# Patient Record
Sex: FEMALE
[De-identification: names, ages, dates, MRNs, and addresses within clinical notes are randomized per-mention and may not be internally consistent; named-entity substitution may affect disease eponyms.]

---

## 2019-09-04 ENCOUNTER — HOSPITAL ENCOUNTER (OUTPATIENT)
Dept: HOSPITAL 5 - OR | Age: 34
Discharge: HOME | End: 2019-09-04
Attending: OBSTETRICS & GYNECOLOGY
Payer: MEDICAID

## 2019-09-04 VITALS — SYSTOLIC BLOOD PRESSURE: 107 MMHG | DIASTOLIC BLOOD PRESSURE: 64 MMHG

## 2019-09-04 DIAGNOSIS — F17.210: ICD-10-CM

## 2019-09-04 DIAGNOSIS — Z80.8: ICD-10-CM

## 2019-09-04 DIAGNOSIS — Z83.3: ICD-10-CM

## 2019-09-04 DIAGNOSIS — Z79.899: ICD-10-CM

## 2019-09-04 DIAGNOSIS — Z3A.09: ICD-10-CM

## 2019-09-04 DIAGNOSIS — Z98.891: ICD-10-CM

## 2019-09-04 DIAGNOSIS — O02.1: Primary | ICD-10-CM

## 2019-09-04 DIAGNOSIS — Z98.890: ICD-10-CM

## 2019-09-04 PROCEDURE — 59820 CARE OF MISCARRIAGE: CPT

## 2019-09-04 PROCEDURE — 88305 TISSUE EXAM BY PATHOLOGIST: CPT

## 2019-09-04 PROCEDURE — 86901 BLOOD TYPING SEROLOGIC RH(D): CPT

## 2019-09-04 PROCEDURE — 86900 BLOOD TYPING SEROLOGIC ABO: CPT

## 2019-09-04 RX ADMIN — HYDROMORPHONE HYDROCHLORIDE PRN MG: 1 INJECTION, SOLUTION INTRAMUSCULAR; INTRAVENOUS; SUBCUTANEOUS at 10:13

## 2019-09-04 RX ADMIN — HYDROMORPHONE HYDROCHLORIDE PRN MG: 1 INJECTION, SOLUTION INTRAMUSCULAR; INTRAVENOUS; SUBCUTANEOUS at 09:53

## 2019-09-04 RX ADMIN — HYDROMORPHONE HYDROCHLORIDE PRN MG: 1 INJECTION, SOLUTION INTRAMUSCULAR; INTRAVENOUS; SUBCUTANEOUS at 10:00

## 2019-09-04 RX ADMIN — HYDROMORPHONE HYDROCHLORIDE PRN MG: 1 INJECTION, SOLUTION INTRAMUSCULAR; INTRAVENOUS; SUBCUTANEOUS at 10:20

## 2019-09-04 NOTE — ANESTHESIA CONSULTATION
Anesthesia Consult and Med Hx


Date of service: 09/04/19





- Airway


Anesthetic Teeth Evaluation: Good


ROM Head & Neck: Adequate


Mental/Hyoid Distance: Adequate


Mallampati Class: Class II


Intubation Access Assessment: Good





- Pulmonary Exam


CTA: Yes





- Cardiac Exam


Cardiac Exam: RRR





- Pre-Operative Health Status


ASA Pre-Surgery Classification: ASA2


Proposed Anesthetic Plan: General





- Pulmonary


Hx Asthma: No


COPD: No


Hx Pneumonia: No





- Cardiovascular System


Hx Hypertension: No





- Central Nervous System


Hx Seizures: No


Hx Psychiatric Problems: No





- Endocrine


Hx Renal Disease: No


Hx End Stage Renal Disease: No


Hx Hypothyroidism: No


Hx Hyperthyroidism: No





- Hematic


Hx Anemia: No


Hx Sickle Cell Disease: No





- Other Systems


Hx Alcohol Use: No

## 2019-09-04 NOTE — SHORT STAY SUMMARY
Short Stay Documentation


Date of service: 19


Narrative H&P: 


Pt is  a 34 year old  who presents for treatment of missed  at 9 

weeks. Pt has not had any bleeding or cramping to date.





- History


H&P: obtained from office


Past Medical History: other (anxiety)


Past Surgical History: No surgical history


Social history: single





- Allergies and Medications


Current Medications: 


                                    Allergies





No Known Allergies Allergy (Verified 16 09:48)


   





                                Home Medications











 Medication  Instructions  Recorded  Confirmed  Last Taken  Type


 


ALPRAZolam [Xanax TAB] 1 mg PO TID PRN 19 History








Active Medications





Hydromorphone HCl (Dilaudid)  0.25 mg IV Q10MIN PRN


   PRN Reason: Pain, Moderate (4-6)


   Stop: 19 22:00


Lactated Ringer's (Lactated Ringers)  1,000 mls @ 100 mls/hr IV AS DIRECT T[place]ILLE


   Last Admin: 19 07:00 Dose:  100 mls/hr


   Documented by: 


Midazolam HCl (Versed)  2 mg IV PREOP NR


   Stop: 19 23:59


   Last Admin: 19 07:13 Dose:  2 mg


   Documented by: 


Ondansetron HCl (Zofran)  4 mg IV ONCE PRN


   PRN Reason: Nausea And Vomiting











- Physical exam


General appearance: no acute distress


Integumentary: no rash


Lungs: Clear to auscultation, Normal air movement


Breasts: deferred


Heart: Regular rate, Normal S1, Normal S2


Gastrointestinal: normal, normoactive bowel sounds


Female Genitourinary: normal


Rectal Exam: deferred


Extremities: no ischemia, pulses intact, No edema





- Brief post op/procedure progress note


Date of procedure: 19


Pre-op diagnosis: missed 


Post-op diagnosis: same


Procedure: 





Dilation and evacuation of uterus


Anesthesia: MAC


Surgeon: SKINNY GODDARD


Estimated blood loss: minimal


Pathology: list


Specimen disposition: to lab


Condition: stable





- Hospital course


Hospital course: 





unremarkable





- Disposition


Condition at discharge: Good


Disposition: DC-01 TO HOME OR SELFCARE





Short Stay Discharge Plan


Activity: advance as tolerated


Weight Bearing Status: Weight Bear as Tolerated


Diet: regular


Follow up with: 


SKINNY GODDARD MD [Staff Physician] - 14 Days


Prescriptions: 


Ibuprofen [Motrin 800 MG tab] 800 mg PO Q8HR PRN #30 tablet


 PRN Reason: Pain, Moderate (4-6)


HYDROcodone/APAP 5-325 [[place] 5/325] 1 each PO Q6HR PRN #15 tablet


 PRN Reason: Pain

## 2019-09-04 NOTE — OPERATIVE REPORT
Operative Report


Operative Report: 





Preoperative diagnoses: Missed 


Postoperative diagnosis: Same





Procedure: D and E


Surgeon: Lalita Alberto M.D.





Anesthesia: MAC


EBL: Minimal 


Urine output: 100 mL clear


Complications: None


Specimens: Products of conception





Procedure: Patient was taken to the OR with IV running and in place.  She will 

probably identified as herself.  She was given adequate anesthesia.  She was 

then placed in the dorsal lithotomy position and prepped and draped in normal 

sterile fashion.  Attention was turned to the patient's vagina.  Her bladder was

then drained of approximately 100 mL of clear yellow urine.  A bivalve speculum 

was placed the patient's vagina.  Cervix was visualized and grasped with a 

single-tooth tenaculum.  The cervix was then gently dilated up to approximately 

29 mm.  Following this, a #10 suction curette was inserted into the patient's 

uterus at the level of the fundus.  It was then attached to the suction machine 

and the curettage was performed removing products of conception.  At one point 

the curet was removed and a large banjo curet was introduced into the uterus to 

further retrieve any additional products.  Following this the suction curette 

was reintroduced and more tissue was obtained.  It was excellent hemostasis 

noted at the end of this portion of the procedure.  At this point all 

instruments were removed from the patient's vagina.  She was then awakened and 

taken to recovery in stable condition.  She tolerated the procedure well